# Patient Record
Sex: MALE | Race: WHITE | URBAN - NONMETROPOLITAN AREA
[De-identification: names, ages, dates, MRNs, and addresses within clinical notes are randomized per-mention and may not be internally consistent; named-entity substitution may affect disease eponyms.]

---

## 2020-10-27 ENCOUNTER — TELEPHONE (OUTPATIENT)
Dept: FAMILY MEDICINE CLINIC | Age: 40
End: 2020-10-27

## 2020-10-27 ENCOUNTER — VIRTUAL VISIT (OUTPATIENT)
Dept: FAMILY MEDICINE CLINIC | Age: 40
End: 2020-10-27

## 2020-10-27 PROCEDURE — 99203 OFFICE O/P NEW LOW 30 MIN: CPT | Performed by: NURSE PRACTITIONER

## 2020-10-27 ASSESSMENT — ENCOUNTER SYMPTOMS
SORE THROAT: 1
VOMITING: 0
RHINORRHEA: 0
CONSTIPATION: 0
ABDOMINAL PAIN: 0
SHORTNESS OF BREATH: 0
COUGH: 0
CHEST TIGHTNESS: 0
DIARRHEA: 0
EYE DISCHARGE: 0

## 2020-10-27 NOTE — TELEPHONE ENCOUNTER
Patient wife calling in stating patient was exposed to covid by nephew. Patient is not experiencing any symptoms at this time. Patient wife would like for patient to be tested. Okay for test. Please advise. Patient moved from [facility] recently. Patient had not seen Bhavin Pearson but would like to establish here since wife see's Bhavin Pearson.

## 2020-10-27 NOTE — PROGRESS NOTES
numbness and headaches. Psychiatric/Behavioral: The patient is not nervous/anxious. Objective: There were no vitals taken for this visit. Physical Exam  Constitutional:       General: He is not in acute distress. Appearance: He is well-developed. Interventions: He is not intubated. HENT:      Head: Normocephalic and atraumatic. Right Ear: External ear normal.      Left Ear: External ear normal.   Eyes:      General: Lids are normal.      Conjunctiva/sclera: Conjunctivae normal.   Neck:      Musculoskeletal: Normal range of motion. Pulmonary:      Effort: No tachypnea, bradypnea, prolonged expiration, respiratory distress or retractions. He is not intubated. Skin:     Coloration: Skin is not pale. Findings: No erythema or rash. Neurological:      Mental Status: He is alert and oriented to person, place, and time. Psychiatric:         Attention and Perception: Attention and perception normal.         Mood and Affect: Mood and affect normal.         Speech: Speech normal.         Behavior: Behavior normal. Behavior is cooperative. Thought Content: Thought content normal.         Cognition and Memory: Cognition and memory normal.         Judgment: Judgment normal.         Assessment/Plan:      Katherine Reed was seen today for other. Diagnoses and all orders for this visit:    Acute viral pharyngitis  -     COVID-19 Ambulatory; Future    Exposure to COVID-19 virus  -     COVID-19 Ambulatory; Future    rest, increase fluids hany water, will get testing at [facility] until covid return      Return if symptoms worsen or fail to improve. Rowan Jordan is a 36 y.o. male being evaluated by a Virtual Visit (video visit) encounter to address concerns as mentioned above. A caregiver was present when appropriate.  Due to this being a TeleHealth encounter (During [place] [address] public health emergency), evaluation of the following organ systems was limited: Vitals/Constitutional/EENT/Resp/CV/GI//MS/Neuro/Skin/Heme-Lymph-Imm. Pursuant to the emergency declaration under the [address] [place] [address] and the [employer] Act, this Virtual Visit was conducted with patient's (and/or legal guardian's) consent, to reduce the patient's risk of exposure to COVID-19 and provide necessary medical care. The patient (and/or legal guardian) has also been advised to contact this office for worsening conditions or problems, and seek emergency medical treatment and/or call 911 if deemed necessary. Patient identification was verified at the start of the visit: Yes    Total time spent for this encounter: Not billed by time    Services were provided through a video synchronous discussion virtually to substitute for in-person clinic visit. Patient and provider were located at their individual homes. --MAREK Allred CNP on 10/28/2020 at 8:13 AM    An electronic signature was used to authenticate this note. Discussed use, benefit, andside effects of prescribed medications. Barriers to medication compliance addressed. All patient questions answered. Pt voiced understanding.      Electronically signedby MAREK Allred CNP on 10/28/2020 at 8:13 AM

## 2020-10-28 LAB — SARS-COV-2: NOT DETECTED

## 2023-04-18 ENCOUNTER — OFFICE VISIT (OUTPATIENT)
Dept: PULMONOLOGY | Age: 43
End: 2023-04-18
Payer: OTHER GOVERNMENT

## 2023-04-18 VITALS
HEART RATE: 69 BPM | OXYGEN SATURATION: 96 % | HEIGHT: 73 IN | WEIGHT: 214.4 LBS | DIASTOLIC BLOOD PRESSURE: 70 MMHG | TEMPERATURE: 98.8 F | SYSTOLIC BLOOD PRESSURE: 120 MMHG | BODY MASS INDEX: 28.41 KG/M2

## 2023-04-18 DIAGNOSIS — G47.33 OSA (OBSTRUCTIVE SLEEP APNEA): Primary | ICD-10-CM

## 2023-04-18 PROCEDURE — 99204 OFFICE O/P NEW MOD 45 MIN: CPT | Performed by: SPECIALIST

## 2023-04-18 NOTE — PROGRESS NOTES
New Sleep Patient H/P    Presentation:  Myranda Obando is referred by No ref. provider found  for    Chief Complaint   Patient presents with    New Patient     Sleep Consult. Chronic snoring and daytime somnolence. Ref by [facility] had PSG 10/11/2015 in [place] (scanned in) never put on CPAP. Patient used to be [place] and had a sleep study in [place] which I reviewed and it showed that the patient had decreased his sleep efficiency and also had the apnea hypopnea index of 12.6 which is worse with a REM and lowest oxygen saturation is 86% and the PLM is about 11.7/h with an index of only 2.2 and has moderate snoring. Apparently patient was never communicated and letter is primary physician told him that his sleep study did not show any significant obstructive sleep apnea and do not require any CPAP. 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 He denies of having any pets. He has significant allergies to pollen and related. He is  and has 2 children. He can fall asleep but he wake up throughout the night and he wake up by 1:30 in the morning and stay awake until 430 for the work. Time in Bed:   Bedtime: 8 PM  Awakens 4:30 AM and stays on the bed about 8 hours but he feels that he sleeps only for 4 to 5 hours  Different on weekends? Same     Reinaldo falls asleep in 5 minutes minutes. Any awakenings? 2-3 times  Difficulty Falling back to sleep? Yes  Symptoms began: Long time ago. Previous evaluation and treatment? Yes  Which ones? Nocturnal polysomnogram    He denies any history of sleep walking or sleep talking. No history of seizures activity. No history suggestive of restless legs syndrome. No history of bruxism. No history of head injury.   He stated that he has been having difficulty of staying asleep and wake up unrefreshed and has been having excessive

## 2023-05-25 ENCOUNTER — HOSPITAL ENCOUNTER (OUTPATIENT)
Dept: SLEEP CENTER | Age: 43
Discharge: HOME | End: 2023-05-27
Payer: OTHER GOVERNMENT

## 2023-05-25 DIAGNOSIS — G47.33 OSA (OBSTRUCTIVE SLEEP APNEA): ICD-10-CM

## 2023-05-25 PROCEDURE — 95810 POLYSOM 6/> YRS 4/> PARAM: CPT

## 2023-05-26 LAB — STATUS: NORMAL

## 2023-05-30 DIAGNOSIS — G47.33 OSA (OBSTRUCTIVE SLEEP APNEA): Primary | ICD-10-CM

## 2023-05-31 ENCOUNTER — TELEPHONE (OUTPATIENT)
Dept: PULMONOLOGY | Age: 43
End: 2023-05-31

## 2023-05-31 NOTE — TELEPHONE ENCOUNTER
----- Message from Medardo Manriquez MD sent at 5/30/2023  9:47 PM EDT -----  Regarding: CPAP  Order for APAP placed

## 2023-06-05 ENCOUNTER — TELEPHONE (OUTPATIENT)
Dept: PULMONOLOGY | Age: 43
End: 2023-06-05

## 2023-06-05 NOTE — TELEPHONE ENCOUNTER
----- Message from Tabatha Ovalle MD sent at 5/26/2023 11:57 AM EDT -----  Regarding: Sleep study  Patient has sleep apnea. If the study and the CPAP is not ordered I will be more than happy to see the patient and discussed with the patient and order the CPAP. Check with the patient and let me know.   Thank you  ----- Message -----  From: Tejal Hough Incoming Sleep Study Results  Sent: 5/26/2023   8:48 AM EDT  To: Tabatha Ovalle MD

## 2024-01-26 ENCOUNTER — APPOINTMENT (OUTPATIENT)
Dept: GENERAL RADIOLOGY | Age: 44
End: 2024-01-26
Payer: OTHER GOVERNMENT

## 2024-01-26 ENCOUNTER — HOSPITAL ENCOUNTER (EMERGENCY)
Age: 44
Discharge: HOME | End: 2024-01-26
Payer: OTHER GOVERNMENT

## 2024-01-26 VITALS
BODY MASS INDEX: 26.39 KG/M2 | TEMPERATURE: 97.8 F | SYSTOLIC BLOOD PRESSURE: 126 MMHG | WEIGHT: 200 LBS | OXYGEN SATURATION: 97 % | HEART RATE: 73 BPM | RESPIRATION RATE: 16 BRPM | DIASTOLIC BLOOD PRESSURE: 83 MMHG

## 2024-01-26 DIAGNOSIS — S61.532A PUNCTURE WOUND OF LEFT WRIST, INITIAL ENCOUNTER: Primary | ICD-10-CM

## 2024-01-26 PROCEDURE — 73110 X-RAY EXAM OF WRIST: CPT

## 2024-01-26 PROCEDURE — 99283 EMERGENCY DEPT VISIT LOW MDM: CPT

## 2024-01-26 NOTE — ED TRIAGE NOTES
Pt to ED with a wound to the left wrist. Pt states that she was working with a stamping machine, trying to get something unstuck and he felt something go into his wrist. There is a small puncture looking hole to the left radial. Pt states that the area was squirting blood. Pt came with the wrist wrapped very tightly with gauze. Bleeding controlled.

## 2024-01-26 NOTE — ED PROVIDER NOTES
[facility] Emergency Department    CHIEF COMPLAINT       Chief Complaint   Patient presents with    Wound Check       Nurses Notes reviewed and I agree except as noted in the HPI.    HISTORY OF PRESENT ILLNESS   Reinaldo Chaves is a 43 y.o. male who presents to the ED for evaluation of wound on left wrist. 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 Patient states there was a lot of bleeding at the time, but it has since stopped. He does have some pain with hand movements. Denies fever, chills, numbness, or tingling. His last tetanus shot was about 1.5 years ago.     HPI was provided by the patient.       PAST MEDICAL HISTORY   No past medical history on file.    SURGICALHISTORY      has no past surgical history on file.    CURRENT MEDICATIONS       There are no discharge medications for this patient.      ALLERGIES     has No Known Allergies.    FAMILY HISTORY     has no family status information on file.    family history is not on file.    SOCIAL HISTORY       Social History     Socioeconomic History    Marital status:      Spouse name: Not on file    Number of children: Not on file    Years of education: Not on file    Highest education level: Not on file   Occupational History    Not on file   Tobacco Use    Smoking status: Former     Current packs/day: 0.00     Types: Cigarettes     Quit date: 2019     Years since quittin.0    Smokeless tobacco: Never   Substance and Sexual Activity    Alcohol use: Yes     Comment: Social    Drug use: Never    Sexual activity: Not on file   Other Topics Concern    Not on file   Social History Narrative    Not on file     Social Determinants of Health     Financial Resource Strain: Not on file   Food Insecurity: Not on file   Transportation Needs: Not on file   Physical Activity: Not on file   Stress: Not on file   Social Connections: Not on file   Intimate Partner Violence: Not on file   Housing  Stability: Not on file       PHYSICAL EXAM     INITIAL VITALS:  weight is 90.7 kg (200 lb). His temperature is 97.8 °F (36.6 °C). His blood pressure is 126/83 and his pulse is 73. His respiration is 16 and oxygen saturation is 97%.    Physical Exam  Constitutional:       General: He is not in acute distress.     Appearance: Normal appearance.   Cardiovascular:      Rate and Rhythm: Normal rate and regular rhythm.      Pulses: Normal pulses.      Heart sounds: Normal heart sounds.   Pulmonary:      Effort: Pulmonary effort is normal.      Breath sounds: Normal breath sounds.   Musculoskeletal:         General: Swelling, tenderness and signs of injury present. Normal range of motion.      Comments: Edema of volar aspect of left wrist below site of injury. Tenderness to palpation of forearm.   Skin:     General: Skin is warm and dry.      Capillary Refill: Capillary refill takes less than 2 seconds.      Findings: Lesion present.      Comments: Small puncture wound on left volar aspect of wrist. No active bleeding, drainage, or erythema noted.   Neurological:      Mental Status: He is alert.         DIFFERENTIAL DIAGNOSIS:   Puncture wound, cellulitis, osteomyelitis    DIAGNOSTIC RESULTS     EKG: All EKG's are interpreted by the Emergency Department Physician who eithersigns or Co-signs this chart in the absence of a cardiologist.    RADIOLOGY: non-plainfilm images(s) such as CT, Ultrasound and MRI are read by the radiologist.  Plain radiographic images are visualized and preliminarily interpreted by the emergency physician unless otherwise stated below.  XR WRIST LEFT (MIN 3 VIEWS)   Final Result   1. No acute bony abnormality            **This report has been created using voice recognition software.  It may contain minor errors which are inherent in voice recognition technology.**      Final report electronically signed by Dr Hortencia Hartley on 1/26/2024 12:14 PM          LABS:   Labs Reviewed - No data to